# Patient Record
Sex: FEMALE | Race: WHITE
[De-identification: names, ages, dates, MRNs, and addresses within clinical notes are randomized per-mention and may not be internally consistent; named-entity substitution may affect disease eponyms.]

---

## 2017-07-01 ENCOUNTER — HOSPITAL ENCOUNTER (EMERGENCY)
Dept: HOSPITAL 74 - JERFT | Age: 32
Discharge: HOME | End: 2017-07-01
Payer: COMMERCIAL

## 2017-07-01 VITALS — DIASTOLIC BLOOD PRESSURE: 78 MMHG | SYSTOLIC BLOOD PRESSURE: 134 MMHG | TEMPERATURE: 98 F | HEART RATE: 81 BPM

## 2017-07-01 VITALS — BODY MASS INDEX: 32.5 KG/M2

## 2017-07-01 DIAGNOSIS — R35.0: Primary | ICD-10-CM

## 2017-07-01 LAB
APPEARANCE UR: CLEAR
BILIRUB UR STRIP.AUTO-MCNC: NEGATIVE MG/DL
COLOR UR: (no result)
KETONES UR QL STRIP: NEGATIVE
LEUKOCYTE ESTERASE UR QL STRIP.AUTO: NEGATIVE
NITRITE UR QL STRIP: NEGATIVE
PH UR: 5 [PH] (ref 5–8)
PROT UR QL STRIP: NEGATIVE
PROT UR QL STRIP: NEGATIVE
RBC # UR STRIP: NEGATIVE /UL
SP GR UR: 1.02 (ref 1–1.02)
UROBILINOGEN UR STRIP-MCNC: NEGATIVE E.U./DL (ref 0.2–1)

## 2017-07-01 NOTE — PDOC
History of Present Illness





- General


Chief Complaint: Urinary Problem


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


Time Seen by Provider: 07/01/17 13:33


History Source: Patient


Exam Limitations: No Limitations





- History of Present Illness


Travel History: No


Timing/Duration: reports: constant, getting worse





Past History





- Past Medical History


Allergies/Adverse Reactions: 


 Allergies











Allergy/AdvReac Type Severity Reaction Status Date / Time


 


No Known Allergies Allergy   Verified 07/01/17 13:21











Home Medications: 


Ambulatory Orders





Cyclobenzaprine HCl [Flexeril -] 10 mg PO TID #12 tablet 12/19/16 


Naproxen [Naprosyn -] 500 mg PO BID #14 tablet 12/19/16 








Other medical history: NONE





- Psycho/Social/Smoking Cessation Hx


Anxiety: No


Suicidal Ideation: No


Smoking History: Never smoked


Have you smoked in the past 12 months: No


Hx Alcohol Use: Yes (SOCIAL)


Drug/Substance Use Hx: No


Substance Use Type: None





Abd/GI Specific PMHX





- Complaint Specific PMHX


GERD: No


GI Ulcer Disease: No





**Review of Systems





- Review of Systems


Able to Perform ROS?: Yes


Is the patient limited English proficient: Yes


Constitutional: Yes: Symptoms Reported, See HPI, Malaise.  No: Fever


HEENTM: No: Symptoms Reported


Respiratory: No: Symptoms reported


: Yes: Symptoms Reported, See HPI, Burning, Dysuria


All Other Systems: Reviewed and Negative





*Physical Exam





- Vital Signs


 Last Vital Signs











Temp Pulse Resp BP Pulse Ox


 


 98.0 F   81   20   134/78   99 


 


 07/01/17 13:19  07/01/17 13:19  07/01/17 13:19  07/01/17 13:19  07/01/17 13:19














- Physical Exam


General Appearance: Yes: Appropriately Dressed, Apparent Distress


HEENT: positive: MEDINA, Normal ENT Inspection, TMs Normal, Pharynx Normal


Neck: positive: Supple.  negative: Tender


Respiratory/Chest: positive: Lungs Clear, Normal Breath Sounds.  negative: 

Chest Tender


Gastrointestinal/Abdominal: positive: Normal Bowel Sounds, Soft.  negative: 

Tender, Guarding, Rebound, Tenderness


Extremity: positive: Normal Capillary Refill, Normal Inspection


Integumentary: positive: Normal Color, Dry, Warm


Neurologic: positive: CNs II-XII NML intact, Fully Oriented, Alert, Normal Mood/

Affect, Normal Response, Motor Strength 5/5





Progress Note





- Progress Note


Progress Note: 


Urinary frequency, will hold antibiotics until urine culture results known. 





*DC/Admit/Observation/Transfer


Diagnosis at time of Disposition: 


 Frequency of urination





- Discharge Dispostion


Disposition: HOME


Condition at time of disposition: Stable


Admit: No





- Patient Instructions


Additional Instructions: 


Rest, drink lots of fluids: Teas, water, soups





Avoid contact with others until fevers and symptoms resolved


Lots of handwashing and good hygiene





Continue over-the-counter medications for symptomatic relief


Tylenol or Motrin for fever and pain





Followup with private physician in one week for repeat urinalysis/reevaluation





Return to emergency department for worsened symptoms, fevers, dehydration

## 2018-01-25 ENCOUNTER — HOSPITAL ENCOUNTER (EMERGENCY)
Dept: HOSPITAL 74 - JERFT | Age: 33
Discharge: HOME | End: 2018-01-25
Payer: COMMERCIAL

## 2018-01-25 VITALS — HEART RATE: 93 BPM | SYSTOLIC BLOOD PRESSURE: 137 MMHG | DIASTOLIC BLOOD PRESSURE: 93 MMHG

## 2018-01-25 VITALS — BODY MASS INDEX: 31.8 KG/M2
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
## 2018-01-25 DIAGNOSIS — T78.40XA: Primary | ICD-10-CM
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
## 2018-01-25 NOTE — PDOC
Rapid Medical Evaluation


Chief Complaint: Allergic Reaction


Time Seen by Provider: 01/25/18 15:03


Medical Evaluation: 


 Allergies











Allergy/AdvReac Type Severity Reaction Status Date / Time


 


No Known Allergies Allergy   Verified 01/25/18 15:02











01/25/18 15:03


The patient presents with a chief complaint of:allergic reaction to L forearm. 

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 

patient. Put alcohol on the arm. 


I have performed a brief in-person evaluation of the pt:


Pertinent physical exam findings:puritic, blancing rash to L forearm.


I have ordered the following:Pepcid, Benadryl 


The patient will proceed to the ED for further evaluation.





**Discharge Disposition





- Diagnosis


 Allergic reaction








- Discharge Dispostion


Disposition: HOME


Condition at time of disposition: Improved





- Referrals


Referrals: 


Shaheen Saeed MD [Primary Care Provider] - 





- Patient Instructions


Printed Discharge Instructions:  DI for General Allergic Reactions


Additional Instructions: 


Discharge Instructions:


-Take benadryl if needed for itching or rash


-Drink plenty of fluids at home


-Return to the ER with any worsening or concerning symptoms





- Post Discharge Activity


Work/School Note:  Back to Work

## 2018-01-25 NOTE — PDOC
History of Present Illness





- General


Chief Complaint: Allergic Reaction


Stated Complaint: ALLERGIC REACTION


Time Seen by Provider: 01/25/18 15:03


History Source: Patient


Exam Limitations: No Limitations





- History of Present Illness


Initial Comments: 





CHIEF COMPLAINT:  33 y/o female, [employer] ER employee, here for allergic reaction. 





HISTORY OF PRESENT ILLNESS:  The patient states that she touched "something" in 

a patient's room and immediately developed a red, itchy rash on her left arm.  

Triage PA gave PO benadryl and zantac.  Rash almost completely resolved.  

Patient denies facial or tongue swelling, difficulty breathing and all other 

symptosm. 








Vital signs on arrival are within normal limits. .





REVIEW OF SYSTEMS:


GENERAL/CONSTITUTIONAL: No fever/chills. No weakness. No weight change.


HEAD, EYES, EARS, NOSE AND THROAT:  No facial swelling.  No change in vision. 

No ear pain or discharge. No sore throat..


GENITOURINARY: No dysuria, frequency, or change in urination.


MUSCULOSKELETAL: No joint or muscle swelling or pain. No neck or back pain.


SKIN: +itchy red rash to left arm. 





PHYSICAL EXAM:


VITAL_SIGNS: within normal limits


GENERAL_APPEARANCE: alert, cooperative, no obvious discomfort.


MENTAL_STATUS: speech clear, oriented X 3, responds appropriately to questions.


FACE:  No angioedema.  No lip or tongue swelling. 


NEURO: motor intact and sensory intact in injured extremity.


EXTREMITIES: good pulse in injured extremity, affected area on extremity has 

mild erythema, mild swelling, mild tenderness and no abrasions\lacerations.


SKIN: Left forearm with very faint macular rash to distal anterior forearm.  No 

streaking. 








Past History





- Past Medical History


Allergies/Adverse Reactions: 


 Allergies











Allergy/AdvReac Type Severity Reaction Status Date / Time


 


No Known Allergies Allergy   Verified 01/25/18 15:02











Home Medications: 


Ambulatory Orders





NK [No Known Home Medication]  01/25/18 








CVA: No


COPD: No


DVT: No


Dementia: No





- Immunization History


Immunization Up to Date: Yes





- Suicide/Smoking/Psychosocial Hx


Smoking History: Never smoked


Have you smoked in the past 12 months: No


Information on smoking cessation initiated: No


Hx Alcohol Use: Yes (SOCIAL)


Drug/Substance Use Hx: No


Substance Use Type: None





*Physical Exam





- Vital Signs


 Last Vital Signs











Temp Pulse Resp BP Pulse Ox


 


    93 H  15   137/93   98 


 


    01/25/18 15:02  01/25/18 15:02  01/25/18 15:02  01/25/18 15:02














ED Treatment Course





- Medications


Given in the ED: 


ED Medications














Discontinued Medications














Generic Name Dose Route Start Last Admin





  Trade Name Gage  PRN Reason Stop Dose Admin


 


Diphenhydramine HCl  25 mg  01/25/18 15:06  01/25/18 15:21





  Benadryl -  PO  01/25/18 15:07  25 mg





  ONCE ONE   Administration


 


Ranitidine HCl  300 mg  01/25/18 15:05  01/25/18 15:21





  Zantac -  PO  01/25/18 15:06  300 mg





  ONCE ONE   Administration














Medical Decision Making





- Medical Decision Making





A/P:  33 y/o female with rash to left forearm while at work.  Given benadryl 

and zantac - rash almost completely resolved.  Instructed the patient to take 

benadryl at home if rash reappears and return to the ER with any worsening or 

concerning symptoms. 





The patient verbalizes understanding of all instructions, has no further 

questions and is awaiting discharge.








*DC/Admit/Observation/Transfer


Diagnosis at time of Disposition: 


Allergic reaction


Qualifiers:


 Encounter type: initial encounter Qualified Code(s): T78.40XA - Allergy, 

unspecified, initial encounter








- Discharge Dispostion


Disposition: HOME


Condition at time of disposition: Improved





- Referrals


Referrals: 


Shaheen Saeed MD [Primary Care Provider] - 





- Patient Instructions


Printed Discharge Instructions:  DI for General Allergic Reactions


Additional Instructions: 


Discharge Instructions:


-Take benadryl if needed for itching or rash


-Drink plenty of fluids at home


-Return to the ER with any worsening or concerning symptoms





- Post Discharge Activity


Forms/Work/School Notes:  Back to Work

## 2021-09-15 ENCOUNTER — HOSPITAL ENCOUNTER (EMERGENCY)
Dept: HOSPITAL 74 - JER | Age: 36
Discharge: HOME | End: 2021-09-15
Payer: COMMERCIAL

## 2021-09-15 VITALS — SYSTOLIC BLOOD PRESSURE: 132 MMHG | TEMPERATURE: 98.1 F | DIASTOLIC BLOOD PRESSURE: 89 MMHG | HEART RATE: 84 BPM

## 2021-09-15 VITALS — BODY MASS INDEX: 33.5 KG/M2

## 2021-09-15 DIAGNOSIS — M54.31: Primary | ICD-10-CM

## 2021-12-15 ENCOUNTER — HOSPITAL ENCOUNTER (EMERGENCY)
Dept: HOSPITAL 74 - JER | Age: 36
Discharge: HOME | End: 2021-12-15
Payer: COMMERCIAL

## 2021-12-15 VITALS — SYSTOLIC BLOOD PRESSURE: 121 MMHG | HEART RATE: 84 BPM | DIASTOLIC BLOOD PRESSURE: 86 MMHG

## 2021-12-15 VITALS — BODY MASS INDEX: 33.6 KG/M2

## 2021-12-15 VITALS — TEMPERATURE: 97.4 F

## 2021-12-15 DIAGNOSIS — R00.0: Primary | ICD-10-CM

## 2021-12-15 LAB
ALBUMIN SERPL-MCNC: 3.1 G/DL (ref 3.4–5)
ALP SERPL-CCNC: 103 U/L (ref 45–117)
ALT SERPL-CCNC: 13 U/L (ref 13–61)
ANION GAP SERPL CALC-SCNC: 9 MMOL/L (ref 8–16)
AST SERPL-CCNC: 15 U/L (ref 15–37)
BASOPHILS # BLD: 1 % (ref 0–2)
BILIRUB SERPL-MCNC: 0.4 MG/DL (ref 0.2–1)
BUN SERPL-MCNC: 10.2 MG/DL (ref 7–18)
CALCIUM SERPL-MCNC: 8.9 MG/DL (ref 8.5–10.1)
CHLORIDE SERPL-SCNC: 107 MMOL/L (ref 98–107)
CO2 SERPL-SCNC: 24 MMOL/L (ref 21–32)
CREAT SERPL-MCNC: 0.8 MG/DL (ref 0.55–1.3)
DEPRECATED RDW RBC AUTO: 14 % (ref 11.6–15.6)
EOSINOPHIL # BLD: 2 % (ref 0–4.5)
GLUCOSE SERPL-MCNC: 97 MG/DL (ref 74–106)
HCT VFR BLD CALC: 37.7 % (ref 32.4–45.2)
HGB BLD-MCNC: 12.8 GM/DL (ref 10.7–15.3)
LYMPHOCYTES # BLD: 24.4 % (ref 8–40)
MAGNESIUM SERPL-MCNC: 2.1 MG/DL (ref 1.8–2.4)
MCH RBC QN AUTO: 28.6 PG (ref 25.7–33.7)
MCHC RBC AUTO-ENTMCNC: 34 G/DL (ref 32–36)
MCV RBC: 84.3 FL (ref 80–96)
MONOCYTES # BLD AUTO: 6.3 % (ref 3.8–10.2)
NEUTROPHILS # BLD: 66.3 % (ref 42.8–82.8)
PLATELET # BLD AUTO: 241 10^3/UL (ref 134–434)
PMV BLD: 8.8 FL (ref 7.5–11.1)
PROT SERPL-MCNC: 7.4 G/DL (ref 6.4–8.2)
RBC # BLD AUTO: 4.47 M/MM3 (ref 3.6–5.2)
SODIUM SERPL-SCNC: 141 MMOL/L (ref 136–145)
WBC # BLD AUTO: 8.5 K/MM3 (ref 4–10)

## 2022-10-03 ENCOUNTER — HOSPITAL ENCOUNTER (EMERGENCY)
Dept: HOSPITAL 74 - JER | Age: 37
Discharge: HOME | End: 2022-10-03
Payer: COMMERCIAL

## 2022-10-03 VITALS
DIASTOLIC BLOOD PRESSURE: 94 MMHG | HEART RATE: 96 BPM | SYSTOLIC BLOOD PRESSURE: 147 MMHG | RESPIRATION RATE: 18 BRPM | TEMPERATURE: 97.7 F

## 2022-10-03 VITALS — BODY MASS INDEX: 78 KG/M2

## 2022-10-03 DIAGNOSIS — S06.0X0A: Primary | ICD-10-CM
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
